# Patient Record
Sex: MALE | Race: WHITE | Employment: FULL TIME | URBAN - METROPOLITAN AREA
[De-identification: names, ages, dates, MRNs, and addresses within clinical notes are randomized per-mention and may not be internally consistent; named-entity substitution may affect disease eponyms.]

---

## 2019-06-01 ENCOUNTER — HOSPITAL ENCOUNTER (OUTPATIENT)
Age: 64
Discharge: HOME | End: 2019-06-01
Payer: COMMERCIAL

## 2019-06-01 LAB — HBA1C MFR BLD: 9.3 % (ref 4–5.6)

## 2019-06-01 PROCEDURE — 83036 HEMOGLOBIN GLYCOSYLATED A1C: CPT

## 2019-06-01 PROCEDURE — 36415 COLL VENOUS BLD VENIPUNCTURE: CPT

## 2020-02-08 ENCOUNTER — HOSPITAL ENCOUNTER (EMERGENCY)
Age: 65
Discharge: HOME | End: 2020-02-08
Attending: EMERGENCY MEDICINE
Payer: COMMERCIAL

## 2020-02-08 VITALS
BODY MASS INDEX: 30.31 KG/M2 | TEMPERATURE: 98.1 F | OXYGEN SATURATION: 96 % | HEIGHT: 68 IN | RESPIRATION RATE: 18 BRPM | HEART RATE: 70 BPM | SYSTOLIC BLOOD PRESSURE: 161 MMHG | WEIGHT: 200 LBS | DIASTOLIC BLOOD PRESSURE: 86 MMHG

## 2020-02-08 PROCEDURE — 99283 EMERGENCY DEPT VISIT LOW MDM: CPT

## 2020-02-08 RX ORDER — METOPROLOL SUCCINATE 25 MG/1
25 TABLET, EXTENDED RELEASE ORAL DAILY
COMMUNITY

## 2020-02-08 RX ORDER — IPRATROPIUM BROMIDE 21 UG/1
2 SPRAY, METERED NASAL EVERY 12 HOURS
Qty: 1 BOTTLE | Refills: 3 | Status: SHIPPED | OUTPATIENT
Start: 2020-02-08 | End: 2021-03-04 | Stop reason: ALTCHOICE

## 2020-02-08 RX ORDER — GUAIFENESIN 600 MG/1
600 TABLET, EXTENDED RELEASE ORAL 2 TIMES DAILY
Qty: 30 TABLET | Refills: 0 | Status: SHIPPED | OUTPATIENT
Start: 2020-02-08 | End: 2020-02-23

## 2020-02-08 ASSESSMENT — ENCOUNTER SYMPTOMS
SINUS PAIN: 1
EYE REDNESS: 0
RHINORRHEA: 1
COUGH: 0
SINUS PRESSURE: 0
NAUSEA: 0
VOMITING: 0
BACK PAIN: 0
EYE PAIN: 0
ABDOMINAL PAIN: 0
WHEEZING: 0
SHORTNESS OF BREATH: 0
EYE DISCHARGE: 0
DIARRHEA: 0
SORE THROAT: 0

## 2020-02-08 NOTE — LETTER
[facility] Emergency Department  [address] [place] [address]  Phone: [phone]               February 9, 2020    Patient: Angella Boothe   YOB: 1955   Date of Visit: 2/8/2020       To Whom It May Concern:    Angella Boothe was seen and treated in our emergency department on 2/8/2020. He may return to work on 02/11/2020.       Sincerely,       Lizzette Lyle RN         Signature:__________________________________

## 2020-02-08 NOTE — ED PROVIDER NOTES
Patient is a 75-year-old male who presents the emergency department nasal congestion left ear pain sore throat generalized congestion of the upper airways and rhinorrhea. He states he symptoms of gone on for approximately 2 weeks. He denies fevers denies muscle aches denies cough denies chest congestion. States he has been taking Claritin for the last 2 days without improvement. He states he did just take his blood pressure medications just before arriving to the emergency department. He denies vision changes headache he states no nausea vomiting diarrhea abdominal pain or leg swelling. The history is provided by the patient. URI   Presenting symptoms: congestion, ear pain, fatigue and rhinorrhea    Presenting symptoms: no cough, no fever and no sore throat    Severity:  Mild  Onset quality:  Gradual  Duration:  2 weeks  Timing:  Intermittent  Progression:  Waxing and waning  Chronicity:  New  Relieved by:  Nothing  Worsened by:  Nothing  Ineffective treatments:  None tried  Associated symptoms: sinus pain    Associated symptoms: no arthralgias, no headaches, no neck pain and no wheezing    Risk factors comment:  Hx of HTN       Review of Systems   Constitutional: Positive for fatigue. Negative for chills and fever. HENT: Positive for congestion, ear pain, rhinorrhea and sinus pain. Negative for sinus pressure and sore throat. Eyes: Negative for pain, discharge and redness. Respiratory: Negative for cough, shortness of breath and wheezing. Cardiovascular: Negative for chest pain. Gastrointestinal: Negative for abdominal pain, diarrhea, nausea and vomiting. Genitourinary: Negative for dysuria and frequency. Musculoskeletal: Negative for arthralgias, back pain and neck pain. Skin: Negative for rash and wound. Neurological: Negative for weakness and headaches. Hematological: Negative for adenopathy. All other systems reviewed and are negative.        Physical Exam  Constitutional: Appearance: Normal appearance. HENT:      Head: Normocephalic and atraumatic. Right Ear: Tympanic membrane normal.      Left Ear: Tympanic membrane is bulging. Tympanic membrane is not injected. Neck:      Musculoskeletal: Normal range of motion. No neck rigidity. Cardiovascular:      Rate and Rhythm: Normal rate and regular rhythm. Pulmonary:      Effort: Pulmonary effort is normal.      Breath sounds: Normal breath sounds. No decreased breath sounds. Abdominal:      General: Abdomen is flat. Bowel sounds are increased. Palpations: Abdomen is soft. Tenderness: There is no abdominal tenderness. Neurological:      Mental Status: He is alert and oriented to person, place, and time. Procedures     MDM           --------------------------------------------- PAST HISTORY ---------------------------------------------  Past Medical History:  has a past medical history of Atherosclerosis of native arteries of extremity with rest pain ([facility] [address] COPD (chronic obstructive pulmonary disease) ([facility] [address] Diverticular disease, Hyperlipidemia, Hypertension, Osteoarthritis, PAD (peripheral artery disease) ([facility] [address] Spine degeneration, Superior mesenteric artery thrombosis ([facility] [address] Tobacco abuse, and Type II or unspecified type diabetes mellitus without mention of complication, not stated as uncontrolled. Past Surgical History:  has a past surgical history that includes cyst removal; Aorto-femoral Bypass Graft (3-22-13); and Incisional hernia repair (10/17/2014). Social History:  reports that he quit smoking about 10 years ago. His smoking use included cigarettes. He smoked 2.00 packs per day. He has never used smokeless tobacco. He reports that he does not drink alcohol or use drugs. Family History: family history includes Colon Cancer in his paternal grandmother; Diabetes in his father; Glaucoma in his brother, father, and sister;  Heart Disease in his father; High Blood Pressure in his mother; Mental Illness in his brother; Pacemaker in his father; Stroke in his father and mother. The patients home medications have been reviewed. Allergies: Patient has no known allergies. -------------------------------------------------- RESULTS -------------------------------------------------  Labs:  No results found for this visit on 02/08/20. Radiology:  No orders to display       ------------------------- NURSING NOTES AND VITALS REVIEWED ---------------------------  Date / Time Roomed:  2/8/2020  7:09 AM  ED Bed Assignment:  18/22    The nursing notes within the ED encounter and vital signs as below have been reviewed. BP (!) 202/91   Pulse 70   Temp 98.1 °F (36.7 °C) (Oral)   Resp 18   Ht 5' 8\" (1.727 m)   Wt 200 lb (90.7 kg)   SpO2 96%   BMI 30.41 kg/m²   Oxygen Saturation Interpretation: Normal      ------------------------------------------ PROGRESS NOTES ------------------------------------------  Patient seen and examined patient symptoms consistent with an upper respiratory infection. Patient been will be treated symptomatically and recommended to follow-up with primary care physician. She is noted to have high blood pressure however he does took blood pressure medicine prior to arrival.    I have spoken with the patient and discussed todays results, in addition to providing specific details for the plan of care and counseling regarding the diagnosis and prognosis. Their questions are answered at this time and they are agreeable with the plan. I discussed at length with them reasons for immediate return here for re evaluation. They will followup with their primary care physician by calling their office tomorrow. --------------------------------- ADDITIONAL PROVIDER NOTES ---------------------------------  At this time the patient is without objective evidence of an acute process requiring hospitalization or inpatient management.   They have remained hemodynamically stable

## 2021-02-04 ENCOUNTER — HOSPITAL ENCOUNTER (OUTPATIENT)
Dept: ULTRASOUND IMAGING | Age: 66
Discharge: HOME | End: 2021-02-06
Payer: MEDICARE

## 2021-02-04 DIAGNOSIS — E13.49 OTHER DIABETIC NEUROLOGICAL COMPLICATION ASSOCIATED WITH OTHER SPECIFIED DIABETES MELLITUS (HCC): ICD-10-CM

## 2021-02-04 PROCEDURE — 93925 LOWER EXTREMITY STUDY: CPT

## 2021-02-20 ENCOUNTER — IMMUNIZATION (OUTPATIENT)
Dept: PRIMARY CARE CLINIC | Age: 66
End: 2021-02-20
Payer: MEDICARE

## 2021-02-20 PROCEDURE — 91300 COVID-19, PFIZER VACCINE 30MCG/0.3ML DOSE: CPT | Performed by: NURSE PRACTITIONER

## 2021-02-20 PROCEDURE — 0001A COVID-19, PFIZER VACCINE 30MCG/0.3ML DOSE: CPT | Performed by: NURSE PRACTITIONER

## 2021-03-03 ENCOUNTER — TELEPHONE (OUTPATIENT)
Dept: VASCULAR SURGERY | Age: 66
End: 2021-03-03

## 2021-03-04 ENCOUNTER — OFFICE VISIT (OUTPATIENT)
Dept: VASCULAR SURGERY | Age: 66
End: 2021-03-04
Payer: MEDICARE

## 2021-03-04 VITALS — DIASTOLIC BLOOD PRESSURE: 70 MMHG | SYSTOLIC BLOOD PRESSURE: 122 MMHG

## 2021-03-04 DIAGNOSIS — B35.9 DERMATOPHYTOSIS: ICD-10-CM

## 2021-03-04 DIAGNOSIS — B35.1 ONYCHOMYCOSIS: ICD-10-CM

## 2021-03-04 DIAGNOSIS — K55.069 SUPERIOR MESENTERIC ARTERY THROMBOSIS (HCC): ICD-10-CM

## 2021-03-04 DIAGNOSIS — Z87.891 HISTORY OF TOBACCO ABUSE: ICD-10-CM

## 2021-03-04 DIAGNOSIS — L97.511 ULCER OF GREAT TOE, RIGHT, LIMITED TO BREAKDOWN OF SKIN (HCC): ICD-10-CM

## 2021-03-04 DIAGNOSIS — R09.89 BRUIT OF LEFT CAROTID ARTERY: ICD-10-CM

## 2021-03-04 DIAGNOSIS — Z95.828 S/P AORTO-BIFEMORAL BYPASS SURGERY: ICD-10-CM

## 2021-03-04 DIAGNOSIS — I73.9 PVD (PERIPHERAL VASCULAR DISEASE) WITH CLAUDICATION (HCC): Primary | ICD-10-CM

## 2021-03-04 PROCEDURE — 3017F COLORECTAL CA SCREEN DOC REV: CPT | Performed by: SURGERY

## 2021-03-04 PROCEDURE — 99205 OFFICE O/P NEW HI 60 MIN: CPT | Performed by: SURGERY

## 2021-03-04 PROCEDURE — 1123F ACP DISCUSS/DSCN MKR DOCD: CPT | Performed by: SURGERY

## 2021-03-04 PROCEDURE — G8421 BMI NOT CALCULATED: HCPCS | Performed by: SURGERY

## 2021-03-04 PROCEDURE — 4040F PNEUMOC VAC/ADMIN/RCVD: CPT | Performed by: SURGERY

## 2021-03-04 PROCEDURE — G8427 DOCREV CUR MEDS BY ELIG CLIN: HCPCS | Performed by: SURGERY

## 2021-03-04 PROCEDURE — G8484 FLU IMMUNIZE NO ADMIN: HCPCS | Performed by: SURGERY

## 2021-03-04 PROCEDURE — 1036F TOBACCO NON-USER: CPT | Performed by: SURGERY

## 2021-03-04 RX ORDER — TERBINAFINE HYDROCHLORIDE 250 MG/1
250 TABLET ORAL DAILY
Qty: 10 TABLET | Refills: 0 | Status: SHIPPED | OUTPATIENT
Start: 2021-03-04 | End: 2021-03-14

## 2021-03-04 RX ORDER — CILOSTAZOL 100 MG/1
100 TABLET ORAL 2 TIMES DAILY
Qty: 60 TABLET | Refills: 11 | Status: SHIPPED
Start: 2021-03-04 | End: 2022-01-21

## 2021-03-04 RX ORDER — AMLODIPINE BESYLATE 5 MG/1
5 TABLET ORAL DAILY
COMMUNITY

## 2021-03-04 NOTE — PROGRESS NOTES
Chief Complaint:   Chief Complaint   Patient presents with    Surgical Consult     Claudication, numbness in feet, legs get tired with walking.          HPI: Patient came to the office, for the evaluation of multiple vascular issues, including history of carotid bruit, mild carotid artery stenosis, extensive history of peripheral vascular disease with occlusion of the superior mesenteric artery, severe aorto femoral arterial occlusive disease extensive bypass surgeries, aorto femoral bypass on the left, common iliac artery on the right, I had a superior mesenteric artery bypass also done more than 4 years ago, did not come back for follow-ups    Patient quit smoking    Patient does have diabetes mellitus and high blood pressure, recently was found to have mildly elevated blood glucose levels and hemoglobin A1c    Recently underwent medical evaluation by his PCP, was found to have abnormal arterial duplex scan, was referred by his PCP for further evaluation    Patient denies any chest pain or any history of congestive heart failure      Patient denies any focal lateralizing neurological symptoms like loss of speech, vision or loss of function of extremity    Patient can walk 1-2 blocks slowly, and denies any symptoms of rest pain    No Known Allergies    Current Outpatient Medications   Medication Sig Dispense Refill    amLODIPine (NORVASC) 5 MG tablet Take 5 mg by mouth daily      terbinafine (LAMISIL) 250 MG tablet Take 1 tablet by mouth daily for 10 days 10 tablet 0    butenafine (LOTRIMIN ULTRA) 1 % CREA Please apply from the toes, in between the toes, foot up to the ankles, twice daily for 1 month, both feet 1 Tube 10    cilostazol (PLETAL) 100 MG tablet Take 1 tablet by mouth 2 times daily 60 tablet 11    metoprolol succinate (TOPROL XL) 25 MG extended release tablet Take 25 mg by mouth daily      metFORMIN (GLUCOPHAGE) 500 MG tablet Take 500 mg by mouth 2 times daily (with meals)  aspirin 81 MG EC tablet Take 81 mg by mouth daily. Last dose 10/9/2014      multivitamin (THERAGRAN) per tablet Take 1 tablet by mouth daily. Last dose 10/15/2014       No current facility-administered medications for this visit.         Past Medical History:   Diagnosis Date    Atherosclerosis of native arteries of extremity with rest pain ([facility] [address] 2/28/2013    COPD (chronic obstructive pulmonary disease) ([facility] [address]     CB    Dermatophytosis 3/4/2021    Diverticular disease     pt denies    Hyperlipidemia     Hypertension     has not been on medication 4/2013    Onychomycosis 3/4/2021    Osteoarthritis     [facility] artery [facility]     Spine degeneration     spondylitic ; pt denies    Superior mesenteric artery thrombosis ([facility] [address] 3/18/2013    Tobacco abuse     Type II or unspecified type diabetes mellitus without mention of complication, not stated as uncontrolled     Ulcer of great toe, right, limited to breakdown of skin ([facility] [address] 3/4/2021       Past Surgical History:   Procedure Laterality Date    AORTO-FEMORAL BYPASS GRAFT  3-22-13     along with a rt common illiac bypass; 2. aorta to sup mesenteric art bypass;  3. ext lt common and superficial fem endarterectomy;  4. repair of the umbilical hernia     CYST REMOVAL      (R) foot    INCISIONAL HERNIA REPAIR  10/17/2014    hernia repair       Family History   Problem Relation Age of Onset    Diabetes Father     Stroke Father     Heart Disease Father     Pacemaker Father     Glaucoma Father     High Blood Pressure Mother     Stroke Mother     Glaucoma Sister     Mental Illness Brother     Glaucoma Brother     Colon Cancer Paternal Grandmother        Social History     Socioeconomic History    Marital status:      Spouse name: Not on file    Number of children: Not on file    Years of education: Not on file    Highest education level: Not on file   Occupational History    Not on file   Social Needs  Financial resource strain: Not on file    Food insecurity     Worry: Not on file     Inability: Not on file   Connect needs     Medical: Not on file     Non-medical: Not on file   Tobacco Use    Smoking status: Former Smoker     Packs/day: 2.00     Types: Cigarettes     Quit date: 2009     Years since quittin.0    Smokeless tobacco: Never Used    Tobacco comment: smoked for 30 years   Substance and Sexual Activity    Alcohol use: No    Drug use: No    Sexual activity: Never   Lifestyle    Physical activity     Days per week: Not on file     Minutes per session: Not on file    Stress: Not on file   Relationships    Social connections     Talks on phone: Not on file     Gets together: Not on file     Attends Judaism service: Not on file     Active member of club or organization: Not on file     Attends meetings of clubs or organizations: Not on file     Relationship status: Not on file    Intimate partner violence     Fear of current or ex partner: Not on file     Emotionally abused: Not on file     Physically abused: Not on file     Forced sexual activity: Not on file   Other Topics Concern    Not on file   Social History Narrative    Not on file       Review of Systems:  Skin:  No abnormal pigmentation or rash  Eyes:  No blurring, diplopia or vision loss  Ears/Nose/Throat:  No hearing loss or vertigo  Respiratory:  No cough, pleuritic chest pain, dyspnea, or wheezing. History of tobacco use  Cardiovascular: No angina, palpitations . Hypertension, hyperlipidemia, severe peripheral vascular disease  Gastrointestinal:  No nausea or vomiting; no abdominal pain or rectal bleeding  Musculoskeletal:  No arthritis or weakness. Neurologic:  No paralysis, paresis, paresthesia, seizures or headaches  Hematologic/Lymphatic/Immunologic:  No anemia, abnormal bleeding/bruising, fever, chills or night sweats. Plan:       Discussed the patient, options, risks benefits and alternatives were explained    The importance of regular follow-up was discussed    His medical issues were explained to the patient, recommended work-up as outlined below and to call me as needed if any increasing symptoms          Patient was instructed to continue walking program and to call if any worsening of symptoms and to call if any focal lateralizing neurological symptoms like loss of speech, vision or loss of function of extremity. All the questions were answered. Orders Placed This Encounter   Procedures    US CAROTID ARTERY BILATERAL    VL LOWER EXTREMITY ARTERIAL SEGMENTAL PRESSURES W PPG     Orders Placed This Encounter   Medications    terbinafine (LAMISIL) 250 MG tablet     Sig: Take 1 tablet by mouth daily for 10 days     Dispense:  10 tablet     Refill:  0    butenafine (LOTRIMIN ULTRA) 1 % CREA     Sig: Please apply from the toes, in between the toes, foot up to the ankles, twice daily for 1 month, both feet     Dispense:  1 Tube     Refill:  10    cilostazol (PLETAL) 100 MG tablet     Sig: Take 1 tablet by mouth 2 times daily     Dispense:  60 tablet     Refill:  11           Indicated follow-up: Return in about 4 weeks (around 4/1/2021), or if symptoms worsen or fail to improve.

## 2021-03-15 ENCOUNTER — IMMUNIZATION (OUTPATIENT)
Dept: PRIMARY CARE CLINIC | Age: 66
End: 2021-03-15
Payer: MEDICARE

## 2021-03-15 PROCEDURE — 91300 COVID-19, PFIZER VACCINE 30MCG/0.3ML DOSE: CPT | Performed by: NURSE PRACTITIONER

## 2021-03-15 PROCEDURE — 0002A COVID-19, PFIZER VACCINE 30MCG/0.3ML DOSE: CPT | Performed by: NURSE PRACTITIONER

## 2021-03-25 ENCOUNTER — TELEPHONE (OUTPATIENT)
Dept: VASCULAR SURGERY | Age: 66
End: 2021-03-25

## 2021-03-25 NOTE — TELEPHONE ENCOUNTER
Called to confirm appointment for 3/26/21 at 21 [phone], left message with date, time, and phone number for patient.

## 2021-03-26 ENCOUNTER — HOSPITAL ENCOUNTER (OUTPATIENT)
Dept: CARDIOLOGY | Age: 66
Discharge: HOME | End: 2021-03-26
Payer: MEDICARE

## 2021-03-26 DIAGNOSIS — R09.89 BRUIT OF LEFT CAROTID ARTERY: ICD-10-CM

## 2021-03-26 DIAGNOSIS — I73.9 PVD (PERIPHERAL VASCULAR DISEASE) WITH CLAUDICATION (HCC): ICD-10-CM

## 2021-03-26 PROCEDURE — 93880 EXTRACRANIAL BILAT STUDY: CPT

## 2021-03-26 PROCEDURE — 93923 UPR/LXTR ART STDY 3+ LVLS: CPT

## 2021-03-29 NOTE — PROCEDURES
[address]                  [address] [facility],  [facility]                                VASCULAR REPORT    PATIENT NAME: Titus Ruff                   :        1955  MED REC NO:   [id number]                            ROOM:  ACCOUNT NO:   [de-identified]                           ADMIT DATE: 2021  PROVIDER:     Noam Jimenez MD    DATE OF PROCEDURE:  2021    CAROTID ULTRASOUND REPORT    INDICATIONS:  History of carotid stenosis. FINDINGS:  Duplex scanning of the right carotid artery revealed the  patient has moderate  plaque with a peak internal carotid velocity of  939, diastolic velocity of 31 cm/sec with plaque causing 40% stenosis. Duplex scanning of the left carotid artery revealed moderate intimal  thickening with a peak internal carotid velocity of 358, diastolic  velocity of 25 cm/sec with 30% stenosis. IMPRESSION:  40% stenosis of the right carotid artery associated with  30% of the left carotid artery, minimal worsening on the right side  compared to three years ago.         Anisha Carcamo MD    D: 2021 8:03:52       T: 2021 10:57:38     [email or web address]  Job#: [id number]     Doc#: [id number]

## 2021-03-29 NOTE — PROCEDURES
[address]                  [address] [facility],  [facility]                                VASCULAR REPORT    PATIENT NAME: Bennett Valles                   :        1955  MED REC NO:   [id number]                            ROOM:  ACCOUNT NO:   [de-identified]                           ADMIT DATE: 2021  PROVIDER:     Issac Carrero MD    DATE OF PROCEDURE:  2021    LOWER EXTREMITY ARTERIAL DOPPLER STUDY    INDICATION:  Status post multiple vascular reconstructions including  aortofemoral bypass and femoropopliteal bypass. FINDINGS:  Lower extremity arterial Doppler study revealed that the  patient does have evidence of bilateral femoropopliteal arterial  occlusive disease with an ankle-brachial index of 0.51 on the right and  0.62 on the left with worsening compared to study done on in 2017.         Collins Mckeon MD    D: 2021 8:05:04       T: 2021 8:07:17     [email or web address]  Job#: [id number]     Doc#: [id number]

## 2021-03-30 ENCOUNTER — TELEPHONE (OUTPATIENT)
Dept: VASCULAR SURGERY | Age: 66
End: 2021-03-30

## 2021-03-30 NOTE — TELEPHONE ENCOUNTER
Called to confirm appointment for 3/31/21 at 3 p.m, left message with date, time, and phone number for patient.

## 2021-03-31 ENCOUNTER — OFFICE VISIT (OUTPATIENT)
Dept: VASCULAR SURGERY | Age: 66
End: 2021-03-31
Payer: MEDICARE

## 2021-03-31 VITALS — WEIGHT: 200 LBS | BODY MASS INDEX: 30.31 KG/M2 | HEIGHT: 68 IN

## 2021-03-31 DIAGNOSIS — R09.89 BRUIT OF LEFT CAROTID ARTERY: ICD-10-CM

## 2021-03-31 DIAGNOSIS — Z95.828 S/P AORTO-BIFEMORAL BYPASS SURGERY: ICD-10-CM

## 2021-03-31 DIAGNOSIS — I73.9 PVD (PERIPHERAL VASCULAR DISEASE) WITH CLAUDICATION (HCC): Primary | ICD-10-CM

## 2021-03-31 PROCEDURE — 4040F PNEUMOC VAC/ADMIN/RCVD: CPT | Performed by: SURGERY

## 2021-03-31 PROCEDURE — G8427 DOCREV CUR MEDS BY ELIG CLIN: HCPCS | Performed by: SURGERY

## 2021-03-31 PROCEDURE — 99213 OFFICE O/P EST LOW 20 MIN: CPT | Performed by: SURGERY

## 2021-03-31 PROCEDURE — G8484 FLU IMMUNIZE NO ADMIN: HCPCS | Performed by: SURGERY

## 2021-03-31 PROCEDURE — 3017F COLORECTAL CA SCREEN DOC REV: CPT | Performed by: SURGERY

## 2021-03-31 PROCEDURE — 1036F TOBACCO NON-USER: CPT | Performed by: SURGERY

## 2021-03-31 PROCEDURE — G8417 CALC BMI ABV UP PARAM F/U: HCPCS | Performed by: SURGERY

## 2021-03-31 PROCEDURE — 1123F ACP DISCUSS/DSCN MKR DOCD: CPT | Performed by: SURGERY

## 2021-03-31 NOTE — PROGRESS NOTES
Chief Complaint:   Chief Complaint   Patient presents with    Circulatory Problem     carotid and PVD         HPI: Patient came to the office, for evaluation of multiple vascular issues, including carotid artery disease and peripheral vascular disease, recently underwent carotid ultrasound and lower external artery Doppler study, came to the office to discuss the test results    Patient tells me that he is doing slightly better on trial of Pletal, able to walk longer distances    The right great toe is healing    Tells me his diabetes is improving    Patient does not smoke anymore small scale probably outpatient what is that we did talk about can be given the name guarded got a call from the unit order old lady    Patient denies any focal lateralizing neurological symptoms like loss of speech, vision or loss of function of extremity    Patient can walk a few blocks , and denies any symptoms of rest pain    No Known Allergies    Current Outpatient Medications   Medication Sig Dispense Refill    amLODIPine (NORVASC) 5 MG tablet Take 5 mg by mouth daily      butenafine (LOTRIMIN ULTRA) 1 % CREA Please apply from the toes, in between the toes, foot up to the ankles, twice daily for 1 month, both feet 1 Tube 10    cilostazol (PLETAL) 100 MG tablet Take 1 tablet by mouth 2 times daily 60 tablet 11    metoprolol succinate (TOPROL XL) 25 MG extended release tablet Take 25 mg by mouth daily      metFORMIN (GLUCOPHAGE) 500 MG tablet Take 500 mg by mouth 2 times daily (with meals)       aspirin 81 MG EC tablet Take 81 mg by mouth daily. Last dose 10/9/2014      multivitamin (THERAGRAN) per tablet Take 1 tablet by mouth daily. Last dose 10/15/2014       No current facility-administered medications for this visit.         Past Medical History:   Diagnosis Date    Atherosclerosis of native arteries of extremity with rest pain ([facility] [address] 2/28/2013    COPD (chronic obstructive pulmonary disease) ([facility])     CB    Dermatophytosis 3/4/2021    Diverticular disease     pt denies    Hyperlipidemia     Hypertension     has not been on medication 2013    Onychomycosis 3/4/2021    Osteoarthritis     PAD (peripheral artery disease) ([facility])     Spine degeneration     spondylitic ; pt denies    Superior mesenteric artery thrombosis ([facility] [address] 3/18/2013    Tobacco abuse     Type II or unspecified type diabetes mellitus without mention of complication, not stated as uncontrolled     Ulcer of great toe, right, limited to breakdown of skin ([facility] [address] 3/4/2021       Past Surgical History:   Procedure Laterality Date    AORTO-FEMORAL BYPASS GRAFT  3-22-13     along with a rt common illiac bypass; 2. aorta to sup mesenteric art bypass;  3. ext lt common and superficial fem endarterectomy;  4. repair of the umbilical hernia     CYST REMOVAL      (R) foot    INCISIONAL HERNIA REPAIR  10/17/2014    hernia repair       Family History   Problem Relation Age of Onset    Diabetes Father     Stroke Father     Heart Disease Father     Pacemaker Father     Glaucoma Father     High Blood Pressure Mother     Stroke Mother     Glaucoma Sister     Mental Illness Brother     Glaucoma Brother     Colon Cancer Paternal Grandmother        Social History     Socioeconomic History    Marital status:      Spouse name: Not on file    Number of children: Not on file    Years of education: Not on file    Highest education level: Not on file   Occupational History    Not on file   Social Needs    Financial resource strain: Not on file    Food insecurity     Worry: Not on file     Inability: Not on file    Transportation needs     Medical: Not on file     Non-medical: Not on file   Tobacco Use    Smoking status: Former Smoker     Packs/day: 2.00     Types: Cigarettes     Quit date: 2009     Years since quittin.0    Smokeless tobacco: Never Used    Tobacco comment: smoked for 30 years   Substance and Sexual Activity    Alcohol use: No    Drug use: No    Sexual activity: Never   Lifestyle    Physical activity     Days per week: Not on file     Minutes per session: Not on file    Stress: Not on file   Relationships    Social connections     Talks on phone: Not on file     Gets together: Not on file     Attends Alevism service: Not on file     Active member of club or organization: Not on file     Attends meetings of clubs or organizations: Not on file     Relationship status: Not on file    Intimate partner violence     Fear of current or ex partner: Not on file     Emotionally abused: Not on file     Physically abused: Not on file     Forced sexual activity: Not on file   Other Topics Concern    Not on file   Social History Narrative    Not on file       Review of Systems:    Eyes:  No blurring, diplopia or vision loss. Respiratory:  No cough, pleuritic chest pain, dyspnea, or wheezing. Cardiovascular: No angina, palpitations . Musculoskeletal:  No arthritis or weakness. Neurologic:  No paralysis, paresis, paresthesia, seizures or headache. Endocrinology: Diabetes mellitus      Physical Exam:  General appearance:  Alert, awake, oriented x 3. No distress. Eyes:  Conjunctivae appear normal; PERRL  Neck:  No jugular venous distention, lymphadenopathy or thyromegaly. Carotid bruit noted  Lungs:  Clear to ausculation bilaterally. No rhonchi, crackles, wheezes  Heart:  Regular rate and rhythm. No rub or murmur  Abdomen:  Soft, non-tender. No masses, organomegaly. Musculoskeletal : No joint effusions, tenderness swelling    Neuro: Speech is intact. Moving all extremities. No focal motor or sensory deficits      Extremities:  Both feet are warm to touch.  The color of both feet is normal.    Dermatophytosis, improved    Pulses Right  Left    Brachial 3 3    Radial    3=normal   Femoral 2 2  2=diminished   Popliteal    1=barely palpable   Dorsalis pedis 0 0  0=absent   Posterior tibial    4=aneurysmal             Other pertinent information:1. The past medical records were reviewed. 2.  The carotid ultrasound as well as the lower extremity arterial Doppler study were personally reviewed by me    Assessment:    1. Bilateral carotid stenosis, 30% on the right and 40% on the left    2. Bilateral femoral-popliteal arterial occlusive disease, with an ankle-brachial index of 0.51 on the right and 0.62 on the left          Plan:       I had a long and detailed discussion with patient, options, risks benefits and alternatives were explained the patient patient explained results of the lower external artery Doppler study, for now continue consider therapy with a trial of aspirin Pletal, call. If any worsening of any symptoms, clearly explained, consider some weight loss, longer distance of walking and if no improvement, patient may require angiography and consider possibility of intervention              Patient was instructed to continue walking program and to call if any worsening of symptoms and to call if any focal lateralizing neurological symptoms like loss of speech, vision or loss of function of extremity. All the questions were answered. Indicated follow-up: Return in about 1 year (around 3/31/2022), or if symptoms worsen or fail to improve.

## 2022-01-21 RX ORDER — CILOSTAZOL 100 MG/1
TABLET ORAL
Qty: 180 TABLET | Refills: 3 | Status: SHIPPED | OUTPATIENT
Start: 2022-01-21

## 2022-03-15 DIAGNOSIS — Z95.828 S/P AORTO-BIFEMORAL BYPASS SURGERY: ICD-10-CM

## 2022-03-15 DIAGNOSIS — I73.9 PVD (PERIPHERAL VASCULAR DISEASE) WITH CLAUDICATION (HCC): Primary | ICD-10-CM

## 2022-04-26 ENCOUNTER — TELEPHONE (OUTPATIENT)
Dept: VASCULAR SURGERY | Age: 67
End: 2022-04-26

## 2022-10-28 ENCOUNTER — TELEPHONE (OUTPATIENT)
Dept: VASCULAR SURGERY | Age: 67
End: 2022-10-28

## 2022-10-28 ENCOUNTER — OFFICE VISIT (OUTPATIENT)
Dept: VASCULAR SURGERY | Age: 67
End: 2022-10-28
Payer: MEDICARE

## 2022-10-28 VITALS — BODY MASS INDEX: 30.01 KG/M2 | HEIGHT: 68 IN | WEIGHT: 198 LBS

## 2022-10-28 DIAGNOSIS — I73.9 PVD (PERIPHERAL VASCULAR DISEASE) WITH CLAUDICATION (HCC): ICD-10-CM

## 2022-10-28 DIAGNOSIS — I65.23 BILATERAL CAROTID ARTERY STENOSIS: ICD-10-CM

## 2022-10-28 DIAGNOSIS — I70.209 FEMORAL-POPLITEAL ATHEROSCLEROSIS (HCC): ICD-10-CM

## 2022-10-28 DIAGNOSIS — Z95.828 S/P AORTO-BIFEMORAL BYPASS SURGERY: ICD-10-CM

## 2022-10-28 DIAGNOSIS — B35.1 ONYCHOMYCOSIS: ICD-10-CM

## 2022-10-28 DIAGNOSIS — S90.122A TRAUMATIC ECCHYMOSIS OF TOE OF LEFT FOOT, INITIAL ENCOUNTER: ICD-10-CM

## 2022-10-28 DIAGNOSIS — L81.9 DISCOLORATION OF SKIN OF TOE: ICD-10-CM

## 2022-10-28 DIAGNOSIS — R09.89 BRUIT OF LEFT CAROTID ARTERY: Primary | ICD-10-CM

## 2022-10-28 DIAGNOSIS — B35.9 DERMATOPHYTOSIS: ICD-10-CM

## 2022-10-28 DIAGNOSIS — K55.069 SUPERIOR MESENTERIC ARTERY THROMBOSIS (HCC): ICD-10-CM

## 2022-10-28 PROBLEM — L97.511 ULCER OF GREAT TOE, RIGHT, LIMITED TO BREAKDOWN OF SKIN (HCC): Status: RESOLVED | Noted: 2021-03-04 | Resolved: 2022-10-28

## 2022-10-28 PROCEDURE — G8417 CALC BMI ABV UP PARAM F/U: HCPCS | Performed by: SURGERY

## 2022-10-28 PROCEDURE — 3017F COLORECTAL CA SCREEN DOC REV: CPT | Performed by: SURGERY

## 2022-10-28 PROCEDURE — G8484 FLU IMMUNIZE NO ADMIN: HCPCS | Performed by: SURGERY

## 2022-10-28 PROCEDURE — G8427 DOCREV CUR MEDS BY ELIG CLIN: HCPCS | Performed by: SURGERY

## 2022-10-28 PROCEDURE — 1036F TOBACCO NON-USER: CPT | Performed by: SURGERY

## 2022-10-28 PROCEDURE — 1123F ACP DISCUSS/DSCN MKR DOCD: CPT | Performed by: SURGERY

## 2022-10-28 PROCEDURE — 99215 OFFICE O/P EST HI 40 MIN: CPT | Performed by: SURGERY

## 2022-10-28 RX ORDER — CLOTRIMAZOLE 1 %
CREAM (GRAM) TOPICAL
Qty: 1 EACH | Refills: 10 | Status: SHIPPED | OUTPATIENT
Start: 2022-10-28

## 2022-10-28 NOTE — PROGRESS NOTES
Chief Complaint:   Chief Complaint   Patient presents with    Circulatory Problem     Left foot 4th and 3rd toes discoloration         HPI: This patient, who is known to have significant peripheral vascular disease, involving the carotid arteries, who had severe vascular compromise the left leg in March 2013, at that time was found to have severe aortoiliac and femoral-popliteal arterial occlusive disease with occlusion of the superior mesenteric artery, underwent extensive bypass surgery and to an aorta to left femoral bypass and right common iliac artery bypass with aorto superior mesenteric bypass and left femoral endarterectomy and has done well    Patient also known to have mild to moderate carotid artery disease    Patient unfortunately did not follow-up for the last 18 months at least    Today, I was called by his PCP, stating that he saw me in the office, was found to discoloration of the toes of the left foot, the third and fourth toes, and apparently patient was trying to move furniture to facilitate placement of his mother, next morning he woke up and noticed the discoloration, because of underlying peripheral vascular disease, ischemic discoloration was suspected and patient was referred here for further evaluation    Patient denies any history of trauma    Patient is on aspirin and Pletal    Patient denies any chest pain, palpitation or shortness of breath    Patient denies any abdominal pain or any weight loss      Patient denies any focal lateralizing neurological symptoms like loss of speech, vision or loss of function of extremity    Patient can walk a few blocks slowly prior to this episode, and denies any symptoms of rest pain    Patient does however give history of tingling and numbness of the feet on and off for the last several years, denies any back problems, but does have diabetes mellitus with diabetic neuropathy    No Known Allergies    Current Outpatient Medications   Medication Sig Dispense Refill    dapagliflozin (FARXIGA) 10 MG tablet Take 10 mg by mouth every morning      clotrimazole (LOTRIMIN AF) 1 % cream Apply topically to the toes, in between the toes, up to the ankles, both feet, twice a day for 1 month 1 each 10    cilostazol (PLETAL) 100 MG tablet TAKE 1 TABLET BY MOUTH TWICE A  tablet 3    amLODIPine (NORVASC) 5 MG tablet Take 5 mg by mouth daily      butenafine (LOTRIMIN ULTRA) 1 % CREA Please apply from the toes, in between the toes, foot up to the ankles, twice daily for 1 month, both feet 1 Tube 10    metoprolol succinate (TOPROL XL) 25 MG extended release tablet Take 25 mg by mouth daily      metFORMIN (GLUCOPHAGE) 500 MG tablet Take 500 mg by mouth 2 times daily (with meals)       aspirin 81 MG EC tablet Take 81 mg by mouth daily. Last dose 10/9/2014      multivitamin (THERAGRAN) per tablet Take 1 tablet by mouth daily. Last dose 10/15/2014       No current facility-administered medications for this visit.        Past Medical History:   Diagnosis Date    Atherosclerosis of native arteries of extremity with rest pain ([address] 2/28/2013    COPD (chronic obstructive pulmonary disease) ([address]     CB    Dermatophytosis 3/4/2021    Discoloration of skin of toe 10/28/2022    Diverticular disease     pt denies    Femoral-popliteal atherosclerosis ([address] 10/28/2022    Hyperlipidemia     Hypertension     has not been on medication 4/2013    Onychomycosis 3/4/2021    Osteoarthritis     PAD (peripheral artery disease) ([facility])     Spine degeneration     spondylitic ; pt denies    Superior mesenteric artery thrombosis ([address] 3/18/2013    Tobacco abuse     Traumatic ecchymosis of toe of left foot 10/28/2022    Type II or unspecified type diabetes mellitus without mention of complication, not stated as uncontrolled     Ulcer of great toe, right, limited to breakdown of skin ([address] 3/4/2021       Past Surgical History:   Procedure Laterality Date    AORTO-FEMORAL BYPASS GRAFT  3-22-13     along with a rt common illiac bypass; 2. aorta to sup mesenteric art bypass;  3. ext lt common and superficial fem endarterectomy;  4. repair of the umbilical hernia     CYST REMOVAL      (R) foot    INCISIONAL HERNIA REPAIR  10/17/2014    hernia repair       Family History   Problem Relation Age of Onset    Diabetes Father     Stroke Father     Heart Disease Father     Pacemaker Father     Glaucoma Father     High Blood Pressure Mother     Stroke Mother     Glaucoma Sister     Mental Illness Brother     Glaucoma Brother     Colon Cancer Paternal Grandmother        Social History     Socioeconomic History    Marital status:      Spouse name: Not on file    Number of children: Not on file    Years of education: Not on file    Highest education level: Not on file   Occupational History    Not on file   Tobacco Use    Smoking status: Former     Packs/day: 2.00     Types: Cigarettes     Quit date: 2009     Years since quittin.6    Smokeless tobacco: Never    Tobacco comments:     smoked for 30 years   Vaping Use    Vaping Use: Never used   Substance and Sexual Activity    Alcohol use: No    Drug use: No    Sexual activity: Never   Other Topics Concern    Not on file   Social History Narrative    Not on file     Social Determinants of Health     Financial Resource Strain: Not on file   Food Insecurity: Not on file   Transportation Needs: Not on file   Physical Activity: Not on file   Stress: Not on file   Social Connections: Not on file   Intimate Partner Violence: Not on file   Housing Stability: Not on file       Review of Systems:  Skin:  No abnormal pigmentation or rash  Eyes:  No blurring, diplopia or vision loss  Ears/Nose/Throat:  No hearing loss or vertigo  Respiratory:  No cough, pleuritic chest pain, dyspnea, or wheezing. Past history of tobacco use, chronic obstructive lung disease  Cardiovascular: No angina, palpitations .   Hypertension, hyperlipidemia  Gastrointestinal:  No nausea or vomiting; no abdominal pain or rectal bleeding  Musculoskeletal:  No arthritis or weakness. Neurologic:  No paralysis, paresis, paresthesia, seizures or headaches  Hematologic/Lymphatic/Immunologic:  No anemia, abnormal bleeding/bruising, fever, chills or night sweats. Endocrine:  No heat or cold intolerance. No polyphagia, polydipsia or polyuria. Diabetes mellitus diabetic neuropathy      Physical Exam:  General appearance:  Alert, awake, oriented x 3. No distress. Skin:  Warm and dry  Head:  Normocephalic. No masses, lesions or tenderness  Eyes:  Conjunctivae appear normal; PERRL  Ears:  External ears normal  Nose/Sinuses:  Septum midline, mucosa normal; no drainage  Oropharynx:  Clear, no exudate noted  Neck:  No jugular venous distention, lymphadenopathy or thyromegaly. Carotid bruit noted  Lungs:  Clear to ausculation bilaterally. No rhonchi, crackles, wheezes  Heart:  Regular rate and rhythm. No rub or murmur  Abdomen:  Soft, non-tender. No masses, organomegaly. Musculoskeletal : No joint effusions, tenderness swelling    Neuro: Speech is intact. Moving all extremities. No focal motor or sensory deficits      Extremities:  Both feet are warm to touch. The color of both feet is normal.      Patient does have ecchymosis and discoloration of the third and fourth toes left foot with bruising, no evidence of acute discoloration neurological compromise noted    Both the feet are warm to touch    Color otherwise normal    Onychomycosis of the toenails noted patient the past recommended podiatry trimming of the nails, patient is by himself    Patient does have underlying dermatophytosis in between the toes      Pulses Right  Left    Brachial 3 3    Radial    3=normal   Femoral 2 3  2=diminished   Popliteal    1=barely palpable   Dorsalis pedis    0=absent   Posterior tibial    4=aneurysmal             Other pertinent information:1. The past medical records were reviewed.       2.  The past vascular work-up that was done in January 2021 is reviewed along with review of the carotid ultrasound as well as review of the lower extremity artery Doppler study at the time    Assessment:    1. Bruit of left carotid artery    2. Dermatophytosis    3. PVD (peripheral vascular disease) with claudication ([address]    4. S/P aorto-bifemoral bypass surgery    5. Femoral-popliteal atherosclerosis ([address]    6. Onychomycosis    7. Discoloration of skin of toe    8. Traumatic ecchymosis of toe of left foot, initial encounter    9. Bilateral carotid artery stenosis    10. Superior mesenteric artery thrombosis ([address]              Plan:       I had a long and detailed discussion the patient, clinically the discoloration of the toes is consistent with ecchymosis and bruising rather than discoloration due to arterial compromise at the present time, the remaining feet, are warm to touch, color normal, intact range of motion sensation symmetrical bilateral the patient has some tingling numbness of the feet due to diabetic neuropathy    The patient is reassured, recommend  topical antifungal cream for underlying dermatophytosis    Patient also recommend complete vascular work-up including lower extremity artery Doppler study with pulse volume recording of the toes, carotid ultrasound to monitor carotid artery disease and to call me if any worsening of symptoms, or any ulceration skin breakdown etc. follow-up evaluation to see me back in 3 to 4 weeks time    The importance of regular follow-up also was discussed          Patient was instructed to continue walking program and to call if any worsening of symptoms and to call if any focal lateralizing neurological symptoms like loss of speech, vision or loss of function of extremity. All the questions were answered.     I have also discussed with his PCP Dr. Emy Willams This Encounter   Procedures    Laurent Kelly     LOWER EXTREMITY ARTERIAL SEGMENTAL PRESSURES W [facility]     Orders Placed This Encounter   Medications    clotrimazole (LOTRIMIN AF) 1 % cream     Sig: Apply topically to the toes, in between the toes, up to the ankles, both feet, twice a day for 1 month     Dispense:  1 each     Refill:  10           Indicated follow-up: Return in about 4 weeks (around 11/25/2022), or if symptoms worsen or fail to improve.

## 2022-10-28 NOTE — TELEPHONE ENCOUNTER
Dr. Amanda Quintero asked Dr. Biju Mcclain to see the pt in office; pt has already left Dr. Simons Rolf office, message left on pt's voicemail for an ov today at 1:15 pm.

## 2022-11-10 ENCOUNTER — TELEPHONE (OUTPATIENT)
Dept: VASCULAR SURGERY | Age: 67
End: 2022-11-10

## 2022-11-22 ENCOUNTER — TELEPHONE (OUTPATIENT)
Dept: VASCULAR SURGERY | Age: 67
End: 2022-11-22

## 2022-12-28 ENCOUNTER — TELEPHONE (OUTPATIENT)
Dept: VASCULAR SURGERY | Age: 67
End: 2022-12-28

## 2022-12-29 ENCOUNTER — OFFICE VISIT (OUTPATIENT)
Dept: VASCULAR SURGERY | Age: 67
End: 2022-12-29
Payer: MEDICARE

## 2022-12-29 ENCOUNTER — HOSPITAL ENCOUNTER (OUTPATIENT)
Dept: CARDIOLOGY | Age: 67
Discharge: HOME | End: 2022-12-29
Payer: MEDICARE

## 2022-12-29 DIAGNOSIS — R09.89 BRUIT OF LEFT CAROTID ARTERY: ICD-10-CM

## 2022-12-29 DIAGNOSIS — K55.069 SUPERIOR MESENTERIC ARTERY THROMBOSIS (HCC): ICD-10-CM

## 2022-12-29 DIAGNOSIS — I70.209 FEMORAL-POPLITEAL ATHEROSCLEROSIS (HCC): ICD-10-CM

## 2022-12-29 DIAGNOSIS — I65.23 BILATERAL CAROTID ARTERY STENOSIS: Primary | ICD-10-CM

## 2022-12-29 DIAGNOSIS — I73.9 PVD (PERIPHERAL VASCULAR DISEASE) WITH CLAUDICATION (HCC): ICD-10-CM

## 2022-12-29 DIAGNOSIS — I65.23 BILATERAL CAROTID ARTERY STENOSIS: ICD-10-CM

## 2022-12-29 DIAGNOSIS — Z95.828 S/P AORTO-BIFEMORAL BYPASS SURGERY: ICD-10-CM

## 2022-12-29 PROBLEM — L81.9 DISCOLORATION OF SKIN OF TOE: Status: RESOLVED | Noted: 2022-10-28 | Resolved: 2022-12-29

## 2022-12-29 PROBLEM — S90.122A: Status: RESOLVED | Noted: 2022-10-28 | Resolved: 2022-12-29

## 2022-12-29 PROCEDURE — G8484 FLU IMMUNIZE NO ADMIN: HCPCS | Performed by: SURGERY

## 2022-12-29 PROCEDURE — 1123F ACP DISCUSS/DSCN MKR DOCD: CPT | Performed by: SURGERY

## 2022-12-29 PROCEDURE — G8417 CALC BMI ABV UP PARAM F/U: HCPCS | Performed by: SURGERY

## 2022-12-29 PROCEDURE — 99214 OFFICE O/P EST MOD 30 MIN: CPT | Performed by: SURGERY

## 2022-12-29 PROCEDURE — 3017F COLORECTAL CA SCREEN DOC REV: CPT | Performed by: SURGERY

## 2022-12-29 PROCEDURE — G8427 DOCREV CUR MEDS BY ELIG CLIN: HCPCS | Performed by: SURGERY

## 2022-12-29 PROCEDURE — 93923 UPR/LXTR ART STDY 3+ LVLS: CPT

## 2022-12-29 PROCEDURE — 93880 EXTRACRANIAL BILAT STUDY: CPT

## 2022-12-29 PROCEDURE — 1036F TOBACCO NON-USER: CPT | Performed by: SURGERY

## 2022-12-29 NOTE — PROGRESS NOTES
Chief Complaint:   Chief Complaint   Patient presents with    Circulatory Problem     Follow up carotid stenosis, PVD         HPI: Patient came to the office, for the evaluation of multiple vascular issues, a few months ago had ecchymosis and bruising of the left foot toe, suspected to be due to ecchymosis while on Pletal and aspirin, that has resolved completely    Patient also underwent complete vascular testing including lower extremity artery Doppler study and carotid ultrasound, wanted me to review it and make recommendations      Patient denies any focal lateralizing neurological symptoms like loss of speech, vision or loss of function of extremity    Patient can walk 1-2 blocks slowly, tells me he does not exercise at all and will start exercising going to the [facility], and denies any symptoms of rest pain    No Known Allergies    Current Outpatient Medications   Medication Sig Dispense Refill    B Complex-C (SUPER B COMPLEX PO) Take by mouth      dapagliflozin (FARXIGA) 10 MG tablet Take 10 mg by mouth every morning      cilostazol (PLETAL) 100 MG tablet TAKE 1 TABLET BY MOUTH TWICE A  tablet 3    amLODIPine (NORVASC) 5 MG tablet Take 5 mg by mouth daily      metoprolol succinate (TOPROL XL) 25 MG extended release tablet Take 25 mg by mouth daily      metFORMIN (GLUCOPHAGE) 500 MG tablet Take 500 mg by mouth 2 times daily (with meals)       aspirin 81 MG EC tablet Take 81 mg by mouth daily. Last dose 10/9/2014       No current facility-administered medications for this visit.        Past Medical History:   Diagnosis Date    Atherosclerosis of native arteries of extremity with rest pain ([address] 2/28/2013    Bruit of left carotid artery 12/29/2022    COPD (chronic obstructive pulmonary disease) ([address]     CB    Dermatophytosis 3/4/2021    Discoloration of skin of toe 10/28/2022    Diverticular disease     pt denies    Femoral-popliteal atherosclerosis ([address] 10/28/2022    Hyperlipidemia     Hypertension     has not been on medication 2013    Onychomycosis 3/4/2021    Osteoarthritis     PAD (peripheral artery disease) ([facility])     Spine degeneration     spondylitic ; pt denies    Superior mesenteric artery thrombosis [facility] 3/18/2013    Tobacco abuse     Traumatic ecchymosis of toe of left foot 10/28/2022    Type II or unspecified type diabetes mellitus without mention of complication, not stated as uncontrolled     Ulcer of great toe, right, limited to breakdown of skin ([address] 3/4/2021       Past Surgical History:   Procedure Laterality Date    AORTO-FEMORAL BYPASS GRAFT  3-22-13     along with a rt common illiac bypass; 2. aorta to sup mesenteric art bypass;  3. ext lt common and superficial fem endarterectomy;  4. repair of the umbilical hernia     CYST REMOVAL      (R) foot    INCISIONAL HERNIA REPAIR  10/17/2014    hernia repair       Family History   Problem Relation Age of Onset    Diabetes Father     Stroke Father     Heart Disease Father     Pacemaker Father     Glaucoma Father     High Blood Pressure Mother     Stroke Mother     Glaucoma Sister     Mental Illness Brother     Glaucoma Brother     Colon Cancer Paternal Grandmother        Social History     Socioeconomic History    Marital status:      Spouse name: Not on file    Number of children: Not on file    Years of education: Not on file    Highest education level: Not on file   Occupational History    Not on file   Tobacco Use    Smoking status: Former     Packs/day: 2.00     Types: Cigarettes     Quit date: 2009     Years since quittin.8    Smokeless tobacco: Never    Tobacco comments:     smoked for 30 years   Vaping Use    Vaping Use: Never used   Substance and Sexual Activity    Alcohol use: No    Drug use: No    Sexual activity: Never   Other Topics Concern    Not on file   Social History Narrative    Not on file     Social Determinants of Health     Financial Resource Strain: Not on file   Food Insecurity: Not on file   Transportation Needs: Not on file   Physical Activity: Not on file   Stress: Not on file   Social Connections: Not on file   Intimate Partner Violence: Not on file   Housing Stability: Not on file       Review of Systems:    Eyes:  No blurring, diplopia or vision loss. Respiratory:  No cough, pleuritic chest pain, dyspnea, or wheezing. Chronic obstructive lung disease  Cardiovascular: No angina, palpitations . Hypertension, hyperlipidemia  Musculoskeletal:  No arthritis or weakness. Neurologic:  No paralysis, paresis, paresthesia, seizures or headache. Endocrinology: Diabetes mellitus  Gastroenterology: History of superimposing artery occlusion        Physical Exam:  General appearance:  Alert, awake, oriented x 3. No distress. Eyes:  Conjunctivae appear normal; PERRL  Neck:  No jugular venous distention, lymphadenopathy or thyromegaly. Left carotid bruit noted  Lungs:  Clear to ausculation bilaterally. No rhonchi, crackles, wheezes  Heart:  Regular rate and rhythm. No rub or murmur  Abdomen:  Soft, non-tender. No masses, organomegaly. Musculoskeletal : No joint effusions, tenderness swelling    Neuro: Speech is intact. Moving all extremities. No focal motor or sensory deficits      Extremities:  Both feet are warm to touch. The color of both feet is normal.        Pulses Right  Left    Brachial 3 3    Radial    3=normal   Femoral 2 2-3  2=diminished   Popliteal    1=barely palpable   Dorsalis pedis 0 1  0=absent   Posterior tibial    4=aneurysmal             Other pertinent information:1. The past medical records were reviewed. Assessment:    1. Bilateral carotid artery stenosis    2. Bruit of left carotid artery    3. Superior mesenteric artery thrombosis ([address]    4. S/P aorto-bifemoral bypass surgery    5.  Femoral-popliteal atherosclerosis ([address]              Plan:       Discussed the patient regarding the carotid ultrasound, stable, 40% stenosis right and 30% stenosis left unchanged, recheck this every 2 to 3 years    The lower extremity arterial Doppler study, evidence of mainly right femoral-popliteal arterial occlusive disease, slight worsening of the metatarsal pulse volume recording the dorsalis pedis Doppler tracings appears to be stable, all options, including CTA of the aorta with runoff for potential vascular intervention was discussed, finally the patient has no new symptoms, follow conservatively with exercise program, aspirin and Pletal, but call immediately with any worsening symptoms, explained              Patient was instructed to continue walking program and to call if any worsening of symptoms and to call if any focal lateralizing neurological symptoms like loss of speech, vision or loss of function of extremity. All the questions were answered. Indicated follow-up: Return in about 1 year (around 12/29/2023), or if symptoms worsen or fail to improve.

## 2022-12-29 NOTE — PROGRESS NOTES
[facility] Heart [facility] Vascular Lab - [facility]    This is a pre read worksheet - prior to official physician interpretation    Lazara Kincaid  1955  Date of study: 12/29/22    Indication for study:  Carotid artery stenosis  Study : Bilateral Carotid Artery Duplex Examination    Duplex examination of the RIGHT carotid artery system identifies atherosclerotic plaque. The peak systolic velocity in internal carotid artery was 155 centimeters / second. The maximum end diastolic velocity was 28 centimeters / second. The ICA/CCA ratio is 1.4. The right vertebral artery has antegrade flow. Duplex examination of the LEFT carotid artery system identifies atherosclerotic plaque. The peak systolic velocity in internal carotid artery was 110 centimeters / second. The maximum end diastolic velocity was 24 centimeters / second. The ICA/CCA ratio is 1.0. The left vertebral artery has retrograde flow.     RIGHT 40%   Mid-distal tortuous  LEFT 30%      LAST STUDY  3/26/2021  Rt 40  Lt 30

## 2023-01-03 NOTE — PROCEDURES
[address]                  [address] [facility]  [facility]                                VASCULAR REPORT    PATIENT NAME: Angeli Bennett                   :        1955  MED REC NO:   [id number]                            ROOM:  ACCOUNT NO:   [de-identified]                           ADMIT DATE: 2022  PROVIDER:     Татьяна Watkins MD    DATE OF PROCEDURE:  2022    LOWER EXTREMITY ARTERIAL DOPPLER STUDY    INDICATION:  Difficulty walking, with history of peripheral vascular  disease status post right toe iliac bypass surgery. FINDINGS:  Lower arterial Doppler study revealed the patient does have  Doppler evidence of right iliac and mainly bilateral femoropopliteal  arterial occlusive disease with ankle-brachial index of 0.56 on the  left. On the right ankle-brachial index is falsely elevated due to  calcification of tibial arteries. Based upon ankle Doppler tracing, no  significant change noted, however, the metatarsal tracing is  significantly diminished on the right compared to the past study.         Mag Currie MD    D: 2023 6:33:20       T: 2023 6:35:31     [email or web address]  Job#: [id number]     Doc#: [id number]

## 2023-01-03 NOTE — PROCEDURES
[address]                  [address] [facility],  [facility]                                VASCULAR REPORT    PATIENT NAME: Geronimo Santana                   :        1955  MED REC NO:   [id number]                            ROOM:  ACCOUNT NO:   [de-identified]                           ADMIT DATE: 2022  PROVIDER:     Nando Lara MD    DATE OF PROCEDURE:  2022    CAROTID ULTRASOUND REPORT    INDICATION:  Bilateral carotid artery stenosis. FINDINGS:  Duplex scanning of the right carotid artery revealed the  patient has moderate plaque with a peak internal carotid velocity of  840, diastolic velocity of 36 cm/sec with plaque causing 40% stenosis. Duplex scanning of the left carotid artery revealed moderate intimal  thickening with a peak internal carotid velocity of 173, diastolic  velocity of 24 cm/sec with plaque causing 30% stenosis. IMPRESSION:  40% stenosis of the right carotid artery associated with  30% stenosis of the left carotid artery, unchanged from the past  studies.         Geeta Donis MD    D: 2023 6:32:19       T: 2023 7:36:59     [email or web address]  Job#: [id number]     Doc#: [id number] Left arm;

## 2023-01-13 RX ORDER — CILOSTAZOL 100 MG/1
TABLET ORAL
Qty: 180 TABLET | Refills: 3 | Status: SHIPPED | OUTPATIENT
Start: 2023-01-13

## 2024-01-08 RX ORDER — CILOSTAZOL 100 MG/1
TABLET ORAL
Qty: 180 TABLET | Refills: 0 | Status: SHIPPED | OUTPATIENT
Start: 2024-01-08

## 2024-04-03 RX ORDER — CILOSTAZOL 100 MG/1
TABLET ORAL
Qty: 180 TABLET | Refills: 0 | Status: SHIPPED | OUTPATIENT
Start: 2024-04-03

## 2024-06-26 ENCOUNTER — TELEPHONE (OUTPATIENT)
Dept: VASCULAR SURGERY | Age: 69
End: 2024-06-26

## 2024-06-26 RX ORDER — CILOSTAZOL 100 MG/1
TABLET ORAL
Qty: 60 TABLET | Refills: 0 | Status: SHIPPED | OUTPATIENT
Start: 2024-06-26

## 2024-06-26 NOTE — TELEPHONE ENCOUNTER
Refill request for Pletal received from pt's pharmacy.  Message left on pt's answering machine that a 1 month supply will be sent in and an office visit is required for further refills.  Otherwise, he will need to contact his PCP for future refills.

## 2024-07-23 RX ORDER — CILOSTAZOL 100 MG/1
TABLET ORAL
Qty: 180 TABLET | Refills: 1 | Status: SHIPPED | OUTPATIENT
Start: 2024-07-23

## 2024-12-30 RX ORDER — CILOSTAZOL 100 MG/1
TABLET ORAL
Qty: 180 TABLET | Refills: 1 | OUTPATIENT
Start: 2024-12-30

## 2025-01-20 RX ORDER — CILOSTAZOL 100 MG/1
TABLET ORAL
Qty: 60 TABLET | Refills: 0 | Status: SHIPPED | OUTPATIENT
Start: 2025-01-20

## 2025-02-13 RX ORDER — CILOSTAZOL 100 MG/1
TABLET ORAL
Qty: 60 TABLET | Refills: 0 | OUTPATIENT
Start: 2025-02-13

## 2025-02-17 RX ORDER — CILOSTAZOL 100 MG/1
TABLET ORAL
Qty: 60 TABLET | Refills: 0 | OUTPATIENT
Start: 2025-02-17

## 2025-03-24 RX ORDER — CILOSTAZOL 100 MG/1
100 TABLET ORAL 2 TIMES DAILY
Qty: 10 TABLET | Refills: 0 | OUTPATIENT
Start: 2025-03-24

## 2025-03-25 RX ORDER — CILOSTAZOL 100 MG/1
100 TABLET ORAL 2 TIMES DAILY
Qty: 14 TABLET | Refills: 0 | Status: SHIPPED | OUTPATIENT
Start: 2025-03-25

## 2025-03-25 RX ORDER — CILOSTAZOL 100 MG/1
100 TABLET ORAL 2 TIMES DAILY
Qty: 60 TABLET | Refills: 0 | OUTPATIENT
Start: 2025-03-25

## 2025-03-27 ENCOUNTER — OFFICE VISIT (OUTPATIENT)
Dept: VASCULAR SURGERY | Age: 70
End: 2025-03-27
Payer: MEDICARE

## 2025-03-27 DIAGNOSIS — Z95.828 S/P AORTO-BIFEMORAL BYPASS SURGERY: ICD-10-CM

## 2025-03-27 DIAGNOSIS — I65.23 BILATERAL CAROTID ARTERY STENOSIS: Primary | ICD-10-CM

## 2025-03-27 DIAGNOSIS — R09.89 BRUIT OF LEFT CAROTID ARTERY: ICD-10-CM

## 2025-03-27 DIAGNOSIS — K55.069 SUPERIOR MESENTERIC ARTERY THROMBOSIS: ICD-10-CM

## 2025-03-27 DIAGNOSIS — I73.9 PVD (PERIPHERAL VASCULAR DISEASE) WITH CLAUDICATION: ICD-10-CM

## 2025-03-27 PROBLEM — B35.9 DERMATOPHYTOSIS: Status: RESOLVED | Noted: 2021-03-04 | Resolved: 2025-03-27

## 2025-03-27 PROCEDURE — 1123F ACP DISCUSS/DSCN MKR DOCD: CPT | Performed by: SURGERY

## 2025-03-27 PROCEDURE — 99215 OFFICE O/P EST HI 40 MIN: CPT | Performed by: SURGERY

## 2025-03-27 PROCEDURE — 1159F MED LIST DOCD IN RCRD: CPT | Performed by: SURGERY

## 2025-03-27 PROCEDURE — 4004F PT TOBACCO SCREEN RCVD TLK: CPT | Performed by: SURGERY

## 2025-03-27 PROCEDURE — G8421 BMI NOT CALCULATED: HCPCS | Performed by: SURGERY

## 2025-03-27 PROCEDURE — G8427 DOCREV CUR MEDS BY ELIG CLIN: HCPCS | Performed by: SURGERY

## 2025-03-27 PROCEDURE — 3017F COLORECTAL CA SCREEN DOC REV: CPT | Performed by: SURGERY

## 2025-03-27 PROCEDURE — 1160F RVW MEDS BY RX/DR IN RCRD: CPT | Performed by: SURGERY

## 2025-03-27 RX ORDER — CILOSTAZOL 100 MG/1
100 TABLET ORAL 2 TIMES DAILY
Qty: 60 TABLET | Refills: 11 | Status: SHIPPED
Start: 2025-03-27 | End: 2025-03-27 | Stop reason: SDUPTHER

## 2025-03-27 RX ORDER — CILOSTAZOL 100 MG/1
100 TABLET ORAL 2 TIMES DAILY
Qty: 180 TABLET | Refills: 3 | Status: SHIPPED | OUTPATIENT
Start: 2025-03-27 | End: 2026-03-22

## 2025-03-27 RX ORDER — ROSUVASTATIN CALCIUM 10 MG/1
10 TABLET, COATED ORAL DAILY
COMMUNITY

## 2025-03-27 NOTE — PROGRESS NOTES
Chief Complaint:   Chief Complaint   Patient presents with    Follow-up     JEAN, PVD         HPI: Patient came to the office after more than 2 years, for evaluation of multiple vascular issues including bilateral carotid artery disease, peripheral vascular disease with claudication status post extensive aortic surgery as outlined below done in March 2013    1) End-to-end aorta to left femoral bypass along with a right   common iliac artery bypass with 16 x 8-mm Dacron graft.  (2) Aorta to   superior mesenteric artery bypass with 8-mm Dacron graft.  (3) Extended left   common and superficial femoral endarterectomy.  (4) Repair of the umbilical   hernia.    Patient was unable to keep his appointments, tells me overall he is not taking good care of himself, sedentary lifestyle    No history of any abdominal pain or postprandial pain, in fact his appetite is good and has gained weight    Multiple medical risk factors including chronic obstructive lung disease diabetes mellitus, hypertension, hyperlipidemia etc. are stable        Patient denies any focal lateralizing neurological symptoms like loss of speech, vision or loss of function of extremity    Patient can walk a few blocks if he slowly, and denies any symptoms of rest pain    No Known Allergies    Current Outpatient Medications   Medication Sig Dispense Refill    rosuvastatin (CRESTOR) 10 MG tablet Take 1 tablet by mouth daily      cilostazol (PLETAL) 100 MG tablet Take 1 tablet by mouth 2 times daily 60 tablet 11    cilostazol (PLETAL) 100 MG tablet Take 1 tablet by mouth 2 times daily 14 tablet 0    B Complex-C (SUPER B COMPLEX PO) Take by mouth      dapagliflozin (FARXIGA) 10 MG tablet Take 1 tablet by mouth every morning      amLODIPine (NORVASC) 5 MG tablet Take 1 tablet by mouth daily      metoprolol succinate (TOPROL XL) 25 MG extended release tablet Take 1 tablet by mouth daily      metFORMIN (GLUCOPHAGE) 500 MG tablet Take 1 tablet by mouth 2 times daily

## 2025-04-16 LAB
MICROORGANISM SPEC CULT: NO GROWTH
SPECIMEN DESCRIPTION: NORMAL

## 2025-05-14 ENCOUNTER — HOSPITAL ENCOUNTER (OUTPATIENT)
Dept: CARDIOLOGY | Age: 70
Discharge: HOME | End: 2025-05-16
Payer: MEDICARE

## 2025-05-14 DIAGNOSIS — I65.23 BILATERAL CAROTID ARTERY STENOSIS: ICD-10-CM

## 2025-05-14 DIAGNOSIS — I73.9 PVD (PERIPHERAL VASCULAR DISEASE) WITH CLAUDICATION: ICD-10-CM

## 2025-05-14 DIAGNOSIS — Z95.828 S/P AORTO-BIFEMORAL BYPASS SURGERY: ICD-10-CM

## 2025-05-14 DIAGNOSIS — R09.89 BRUIT OF LEFT CAROTID ARTERY: ICD-10-CM

## 2025-05-14 LAB
VAS LEFT ABI: 0.41
VAS LEFT ANKLE BP: 59 MMHG
VAS LEFT ARM BP: 111 MMHG
VAS LEFT CALF PRESSURE: 66 MMHG
VAS LEFT CCA DIST EDV: 13.3 CM/S
VAS LEFT CCA DIST PSV: 94.2 CM/S
VAS LEFT CCA PROX EDV: 12.5 CM/S
VAS LEFT CCA PROX PSV: 88.7 CM/S
VAS LEFT DORSALIS PEDIS BP: 59 MMHG
VAS LEFT ECA EDV: 0 CM/S
VAS LEFT ECA PSV: 213.7 CM/S
VAS LEFT ICA DIST EDV: 21.6 CM/S
VAS LEFT ICA DIST PSV: 119.6 CM/S
VAS LEFT ICA MID EDV: 22.9 CM/S
VAS LEFT ICA MID PSV: 98 CM/S
VAS LEFT ICA PROX EDV: 15.6 CM/S
VAS LEFT ICA PROX PSV: 84.7 CM/S
VAS LEFT ICA/CCA PSV: 1.3 NO UNITS
VAS LEFT PTA BP: 59 MMHG
VAS LEFT TBI: 0.32
VAS LEFT THIGH PRESSURE: 117 MMHG
VAS LEFT TOE PRESSURE: 46 MMHG
VAS LEFT VERTEBRAL EDV: 0 CM/S
VAS LEFT VERTEBRAL PSV: 51.7 CM/S
VAS RIGHT ANKLE BP: 58 MMHG
VAS RIGHT ARM BP: 144 MMHG
VAS RIGHT CALF PRESSURE: 55 MMHG
VAS RIGHT CCA DIST EDV: 18 CM/S
VAS RIGHT CCA DIST PSV: 103.2 CM/S
VAS RIGHT CCA PROX EDV: 3.4 CM/S
VAS RIGHT CCA PROX PSV: 106.9 CM/S
VAS RIGHT DORSALIS PEDIS BP: 58 MMHG
VAS RIGHT ECA EDV: 0 CM/S
VAS RIGHT ECA PSV: 168.7 CM/S
VAS RIGHT ICA DIST EDV: 18 CM/S
VAS RIGHT ICA DIST PSV: 114.1 CM/S
VAS RIGHT ICA MID EDV: 27 CM/S
VAS RIGHT ICA MID PSV: 126.8 CM/S
VAS RIGHT ICA PROX EDV: 10.7 CM/S
VAS RIGHT ICA PROX PSV: 101.4 CM/S
VAS RIGHT ICA/CCA PSV: 1.2 NO UNITS
VAS RIGHT PTA BP: 300 MMHG
VAS RIGHT TBI: 0.19
VAS RIGHT THIGH PRESSURE: 79 MMHG
VAS RIGHT TOE PRESSURE: 28 MMHG
VAS RIGHT VERTEBRAL EDV: 18 CM/S
VAS RIGHT VERTEBRAL PSV: 81.5 CM/S

## 2025-05-14 PROCEDURE — 93880 EXTRACRANIAL BILAT STUDY: CPT

## 2025-05-14 PROCEDURE — 93880 EXTRACRANIAL BILAT STUDY: CPT | Performed by: SURGERY

## 2025-05-14 PROCEDURE — 93923 UPR/LXTR ART STDY 3+ LVLS: CPT | Performed by: SURGERY

## 2025-05-14 PROCEDURE — 93923 UPR/LXTR ART STDY 3+ LVLS: CPT

## 2025-05-16 ENCOUNTER — TELEPHONE (OUTPATIENT)
Dept: VASCULAR SURGERY | Age: 70
End: 2025-05-16

## 2025-05-16 NOTE — TELEPHONE ENCOUNTER
Randall called for his results of his Ultra Sound. He is getting surgery on Wednesday. He needed to know before then.

## 2025-05-16 NOTE — TELEPHONE ENCOUNTER
The patient's vascular workup that was done was personally reviewed by me as outlined below    1.  The carotid ultrasound revealed approximately 40% stenosed right and 30% stenosed left, unchanged from the past study asymptomatic    2.  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 The right TBI is 0.2 on the left side 0.32       The patient was explained, his vascular status has gotten worse particular in the right leg but clinically patient is stable no symptoms of rest pain and able to walk short distances slowly    Patient is recommended, to discuss with his urologist, undergoing workup for hematuria when permitted, start taking 81 mg Ecotrin daily    To call me if any symptoms of rest pain in the feet, ulceration skin breakdown etc. and to make sure that he does keep his appointments on regular basis    If his symptoms worsen, he patient may need angiography to consider further intervention    All his questions were answered    Abel Goode MD

## 2025-05-21 ENCOUNTER — HOSPITAL ENCOUNTER (OUTPATIENT)
Age: 70
Discharge: HOME | End: 2025-05-23

## 2025-05-28 LAB — SURGICAL PATHOLOGY REPORT: NORMAL

## 2025-07-09 ENCOUNTER — TRANSCRIBE ORDERS (OUTPATIENT)
Dept: ADMINISTRATIVE | Age: 70
End: 2025-07-09

## 2025-07-09 DIAGNOSIS — R94.31 ABNORMAL ELECTROCARDIOGRAPHY: Primary | ICD-10-CM

## 2025-07-14 ENCOUNTER — TELEPHONE (OUTPATIENT)
Dept: CARDIOLOGY | Age: 70
End: 2025-07-14

## 2025-07-14 NOTE — TELEPHONE ENCOUNTER
Left message to schedule Lexiscan stress test.  Electronically signed by Azalea Muller on 7/14/2025 at 1:35 PM

## 2025-08-08 ENCOUNTER — TELEPHONE (OUTPATIENT)
Dept: CARDIOLOGY | Age: 70
End: 2025-08-08

## 2025-08-19 ENCOUNTER — TELEPHONE (OUTPATIENT)
Dept: CARDIOLOGY | Age: 70
End: 2025-08-19